# Patient Record
Sex: FEMALE | Race: BLACK OR AFRICAN AMERICAN | NOT HISPANIC OR LATINO | Employment: PART TIME | RURAL
[De-identification: names, ages, dates, MRNs, and addresses within clinical notes are randomized per-mention and may not be internally consistent; named-entity substitution may affect disease eponyms.]

---

## 2020-05-12 LAB — HIV1/HIV2 ANTIBODY: NONREACTIVE

## 2020-11-13 ENCOUNTER — HISTORICAL (OUTPATIENT)
Dept: ADMINISTRATIVE | Facility: HOSPITAL | Age: 35
End: 2020-11-13

## 2020-11-13 LAB — HCG UR QL IA.RAPID: NEGATIVE

## 2020-11-17 LAB
REPORT: 25
REPORT: 25
REPORT: NORMAL

## 2020-12-16 ENCOUNTER — HISTORICAL (OUTPATIENT)
Dept: ADMINISTRATIVE | Facility: HOSPITAL | Age: 35
End: 2020-12-16

## 2020-12-17 LAB
INSULIN SERPL-ACNC: NORMAL U[IU]/ML
LAB AP CLINICAL INFORMATION: NORMAL
LAB AP COMMENTS: NORMAL
LAB AP DIAGNOSIS - HISTORICAL: NORMAL
LAB AP GROSS PATHOLOGY - HISTORICAL: NORMAL
LAB AP SPECIMEN SUBMITTED - HISTORICAL: NORMAL

## 2021-03-17 ENCOUNTER — OFFICE VISIT (OUTPATIENT)
Dept: FAMILY MEDICINE | Facility: CLINIC | Age: 36
End: 2021-03-17
Payer: MEDICARE

## 2021-03-17 VITALS
OXYGEN SATURATION: 100 % | WEIGHT: 225 LBS | DIASTOLIC BLOOD PRESSURE: 80 MMHG | HEIGHT: 65 IN | BODY MASS INDEX: 37.49 KG/M2 | HEART RATE: 94 BPM | TEMPERATURE: 98 F | RESPIRATION RATE: 20 BRPM | SYSTOLIC BLOOD PRESSURE: 132 MMHG

## 2021-03-17 DIAGNOSIS — R30.0 DYSURIA: Primary | ICD-10-CM

## 2021-03-17 DIAGNOSIS — N30.00 ACUTE CYSTITIS WITHOUT HEMATURIA: ICD-10-CM

## 2021-03-17 DIAGNOSIS — N89.8 VAGINAL DISCHARGE: ICD-10-CM

## 2021-03-17 PROBLEM — Z95.810 CARDIAC DEFIBRILLATOR IN SITU: Status: ACTIVE | Noted: 2018-03-13

## 2021-03-17 PROBLEM — I48.0 PAROXYSMAL ATRIAL FIBRILLATION: Status: ACTIVE | Noted: 2018-06-05

## 2021-03-17 LAB
BACTERIA #/AREA URNS HPF: ABNORMAL /HPF
BILIRUB SERPL-MCNC: NEGATIVE MG/DL
BLOOD URINE, POC: ABNORMAL
CLUE CELLS VAG QL WET PREP: ABNORMAL /LPF
COLOR, POC UA: ABNORMAL
GLUCOSE UR QL STRIP: NEGATIVE
KETONES UR QL STRIP: NEGATIVE
LEUKOCYTE ESTERASE URINE, POC: ABNORMAL
NITRITE, POC UA: NEGATIVE
PH, POC UA: 5.5
PROTEIN, POC: NEGATIVE
RBC #/AREA URNS HPF: ABNORMAL /HPF
SPECIFIC GRAVITY, POC UA: 1.01
SQUAMOUS #/AREA URNS LPF: ABNORMAL /[LPF]
TRICHOMONAS #/AREA URNS HPF: ABNORMAL /HPF
UROBILINOGEN, POC UA: 0.2
WBC #/AREA URNS HPF: ABNORMAL /HPF
WBC CLUMPS, UA: ABNORMAL /HPF
YEAST #/AREA URNS HPF: ABNORMAL /HPF

## 2021-03-17 PROCEDURE — 99214 PR OFFICE/OUTPT VISIT, EST, LEVL IV, 30-39 MIN: ICD-10-PCS | Mod: ,,, | Performed by: FAMILY MEDICINE

## 2021-03-17 PROCEDURE — 99214 OFFICE O/P EST MOD 30 MIN: CPT | Mod: ,,, | Performed by: FAMILY MEDICINE

## 2021-03-17 PROCEDURE — 87210 SMEAR WET MOUNT SALINE/INK: CPT

## 2021-03-17 PROCEDURE — 81003 URINALYSIS AUTO W/O SCOPE: CPT | Mod: RHCUB | Performed by: FAMILY MEDICINE

## 2021-03-17 RX ORDER — FLUTICASONE PROPIONATE AND SALMETEROL 250; 50 UG/1; UG/1
1 POWDER RESPIRATORY (INHALATION) 2 TIMES DAILY
COMMUNITY
End: 2023-04-18

## 2021-03-17 RX ORDER — ALBUTEROL SULFATE 1.25 MG/3ML
1.25 SOLUTION RESPIRATORY (INHALATION) EVERY 6 HOURS PRN
COMMUNITY

## 2021-03-17 RX ORDER — NITROFURANTOIN 25; 75 MG/1; MG/1
100 CAPSULE ORAL 2 TIMES DAILY
Qty: 14 CAPSULE | Refills: 0 | Status: SHIPPED | OUTPATIENT
Start: 2021-03-17 | End: 2021-03-24

## 2021-03-17 RX ORDER — POTASSIUM CHLORIDE 750 MG/1
20 TABLET, EXTENDED RELEASE ORAL ONCE
COMMUNITY

## 2021-03-17 RX ORDER — LANOLIN ALCOHOL/MO/W.PET/CERES
400 CREAM (GRAM) TOPICAL DAILY
COMMUNITY
End: 2021-05-14 | Stop reason: SDUPTHER

## 2021-03-17 RX ORDER — FUROSEMIDE 20 MG/1
20 TABLET ORAL
COMMUNITY

## 2021-03-17 RX ORDER — CARVEDILOL 25 MG/1
12.5 TABLET ORAL 2 TIMES DAILY WITH MEALS
COMMUNITY

## 2021-03-17 RX ORDER — MONTELUKAST SODIUM 10 MG/1
10 TABLET ORAL NIGHTLY
COMMUNITY

## 2021-03-17 RX ORDER — SPIRONOLACTONE 25 MG/1
25 TABLET ORAL DAILY
COMMUNITY
End: 2022-01-19 | Stop reason: SDUPTHER

## 2021-03-17 RX ORDER — DIGOXIN 250 MCG
250 TABLET ORAL DAILY
COMMUNITY
End: 2022-02-16 | Stop reason: SDUPTHER

## 2021-03-17 RX ORDER — HYDRALAZINE HYDROCHLORIDE 25 MG/1
25 TABLET, FILM COATED ORAL 3 TIMES DAILY
COMMUNITY
End: 2023-04-18

## 2021-03-30 ENCOUNTER — TELEPHONE (OUTPATIENT)
Dept: FAMILY MEDICINE | Facility: CLINIC | Age: 36
End: 2021-03-30

## 2021-03-30 RX ORDER — FLUCONAZOLE 150 MG/1
150 TABLET ORAL DAILY
Qty: 1 TABLET | Refills: 0 | Status: SHIPPED | OUTPATIENT
Start: 2021-03-30 | End: 2021-03-31

## 2021-04-07 ENCOUNTER — OFFICE VISIT (OUTPATIENT)
Dept: FAMILY MEDICINE | Facility: CLINIC | Age: 36
End: 2021-04-07
Payer: MEDICARE

## 2021-04-07 VITALS
HEIGHT: 65 IN | HEART RATE: 89 BPM | RESPIRATION RATE: 20 BRPM | BODY MASS INDEX: 39.82 KG/M2 | WEIGHT: 239 LBS | SYSTOLIC BLOOD PRESSURE: 136 MMHG | DIASTOLIC BLOOD PRESSURE: 70 MMHG | TEMPERATURE: 97 F | OXYGEN SATURATION: 98 %

## 2021-04-07 DIAGNOSIS — N89.8 VAGINAL DISCHARGE: Primary | ICD-10-CM

## 2021-04-07 PROCEDURE — 99051 PR MEDICAL SERVICES, EVE/WKEND/HOLIDAY: ICD-10-PCS | Mod: ,,, | Performed by: FAMILY MEDICINE

## 2021-04-07 PROCEDURE — 99051 MED SERV EVE/WKEND/HOLIDAY: CPT | Mod: ,,, | Performed by: FAMILY MEDICINE

## 2021-04-07 PROCEDURE — 99213 PR OFFICE/OUTPT VISIT, EST, LEVL III, 20-29 MIN: ICD-10-PCS | Mod: ,,, | Performed by: FAMILY MEDICINE

## 2021-04-07 PROCEDURE — 99213 OFFICE O/P EST LOW 20 MIN: CPT | Mod: ,,, | Performed by: FAMILY MEDICINE

## 2021-04-09 RX ORDER — METRONIDAZOLE 500 MG/1
500 TABLET ORAL EVERY 12 HOURS
Qty: 14 TABLET | Refills: 0 | Status: SHIPPED | OUTPATIENT
Start: 2021-04-09 | End: 2021-04-28

## 2021-04-28 ENCOUNTER — OFFICE VISIT (OUTPATIENT)
Dept: FAMILY MEDICINE | Facility: CLINIC | Age: 36
End: 2021-04-28
Payer: MEDICARE

## 2021-04-28 VITALS
RESPIRATION RATE: 20 BRPM | HEIGHT: 65 IN | SYSTOLIC BLOOD PRESSURE: 130 MMHG | TEMPERATURE: 98 F | DIASTOLIC BLOOD PRESSURE: 82 MMHG | WEIGHT: 236 LBS | BODY MASS INDEX: 39.32 KG/M2 | OXYGEN SATURATION: 99 % | HEART RATE: 78 BPM

## 2021-04-28 DIAGNOSIS — J30.1 SEASONAL ALLERGIC RHINITIS DUE TO POLLEN: Primary | ICD-10-CM

## 2021-04-28 PROCEDURE — 99213 PR OFFICE/OUTPT VISIT, EST, LEVL III, 20-29 MIN: ICD-10-PCS | Mod: ,,, | Performed by: NURSE PRACTITIONER

## 2021-04-28 PROCEDURE — 99213 OFFICE O/P EST LOW 20 MIN: CPT | Mod: ,,, | Performed by: NURSE PRACTITIONER

## 2021-04-28 RX ORDER — BUSPIRONE HYDROCHLORIDE 15 MG/1
15 TABLET ORAL 2 TIMES DAILY
COMMUNITY
Start: 2021-04-09

## 2021-04-28 RX ORDER — FLUTICASONE PROPIONATE 50 MCG
2 SPRAY, SUSPENSION (ML) NASAL DAILY
Qty: 18.2 ML | Refills: 2 | Status: SHIPPED | OUTPATIENT
Start: 2021-04-28

## 2021-04-28 RX ORDER — LORATADINE 10 MG/1
10 TABLET ORAL DAILY
Qty: 90 TABLET | Refills: 1 | Status: SHIPPED | OUTPATIENT
Start: 2021-04-28 | End: 2022-04-28

## 2021-05-14 RX ORDER — LANOLIN ALCOHOL/MO/W.PET/CERES
400 CREAM (GRAM) TOPICAL DAILY
Qty: 90 TABLET | Refills: 0 | Status: SHIPPED | OUTPATIENT
Start: 2021-05-14

## 2021-06-22 ENCOUNTER — OFFICE VISIT (OUTPATIENT)
Dept: FAMILY MEDICINE | Facility: CLINIC | Age: 36
End: 2021-06-22
Payer: MEDICARE

## 2021-06-22 VITALS
WEIGHT: 227 LBS | DIASTOLIC BLOOD PRESSURE: 70 MMHG | SYSTOLIC BLOOD PRESSURE: 130 MMHG | BODY MASS INDEX: 37.82 KG/M2 | HEIGHT: 65 IN | HEART RATE: 65 BPM | OXYGEN SATURATION: 98 % | TEMPERATURE: 98 F

## 2021-06-22 DIAGNOSIS — I42.0 NONISCHEMIC DILATED CARDIOMYOPATHY: Primary | ICD-10-CM

## 2021-06-22 DIAGNOSIS — R20.2 PARESTHESIA: ICD-10-CM

## 2021-06-22 DIAGNOSIS — Z79.899 OTHER LONG TERM (CURRENT) DRUG THERAPY: ICD-10-CM

## 2021-06-22 LAB
ANION GAP SERPL CALCULATED.3IONS-SCNC: 10 MMOL/L (ref 7–16)
BUN SERPL-MCNC: 14 MG/DL (ref 7–18)
BUN/CREAT SERPL: 17 (ref 6–20)
CALCIUM SERPL-MCNC: 9 MG/DL (ref 8.5–10.1)
CHLORIDE SERPL-SCNC: 106 MMOL/L (ref 98–107)
CO2 SERPL-SCNC: 27 MMOL/L (ref 21–32)
CREAT SERPL-MCNC: 0.83 MG/DL (ref 0.55–1.02)
FOLATE SERPL-MCNC: 7.5 NG/ML (ref 3.1–17.5)
GLUCOSE SERPL-MCNC: 110 MG/DL (ref 74–106)
MAGNESIUM SERPL-MCNC: 2.2 MG/DL (ref 1.7–2.3)
POTASSIUM SERPL-SCNC: 3.7 MMOL/L (ref 3.5–5.1)
SODIUM SERPL-SCNC: 139 MMOL/L (ref 136–145)
VIT B12 SERPL-MCNC: 860 PG/ML (ref 193–986)

## 2021-06-22 PROCEDURE — 99214 PR OFFICE/OUTPT VISIT, EST, LEVL IV, 30-39 MIN: ICD-10-PCS | Mod: ,,, | Performed by: FAMILY MEDICINE

## 2021-06-22 PROCEDURE — 82607 VITAMIN B-12: CPT | Mod: ,,, | Performed by: CLINICAL MEDICAL LABORATORY

## 2021-06-22 PROCEDURE — 80048 BASIC METABOLIC PANEL: ICD-10-PCS | Mod: ,,, | Performed by: CLINICAL MEDICAL LABORATORY

## 2021-06-22 PROCEDURE — 82607 VITAMIN B12/FOLATE, SERUM PANEL: ICD-10-PCS | Mod: ,,, | Performed by: CLINICAL MEDICAL LABORATORY

## 2021-06-22 PROCEDURE — 82746 VITAMIN B12/FOLATE, SERUM PANEL: ICD-10-PCS | Mod: ,,, | Performed by: CLINICAL MEDICAL LABORATORY

## 2021-06-22 PROCEDURE — 80048 BASIC METABOLIC PNL TOTAL CA: CPT | Mod: ,,, | Performed by: CLINICAL MEDICAL LABORATORY

## 2021-06-22 PROCEDURE — 82746 ASSAY OF FOLIC ACID SERUM: CPT | Mod: ,,, | Performed by: CLINICAL MEDICAL LABORATORY

## 2021-06-22 PROCEDURE — 83735 MAGNESIUM: ICD-10-PCS | Mod: ,,, | Performed by: CLINICAL MEDICAL LABORATORY

## 2021-06-22 PROCEDURE — 83735 ASSAY OF MAGNESIUM: CPT | Mod: ,,, | Performed by: CLINICAL MEDICAL LABORATORY

## 2021-06-22 PROCEDURE — 99214 OFFICE O/P EST MOD 30 MIN: CPT | Mod: ,,, | Performed by: FAMILY MEDICINE

## 2021-06-22 RX ORDER — ISOSORBIDE DINITRATE 20 MG/1
20 TABLET ORAL 3 TIMES DAILY
COMMUNITY

## 2021-06-22 RX ORDER — DAPAGLIFLOZIN 10 MG/1
10 TABLET, FILM COATED ORAL DAILY
COMMUNITY

## 2021-06-24 ENCOUNTER — TELEPHONE (OUTPATIENT)
Dept: FAMILY MEDICINE | Facility: CLINIC | Age: 36
End: 2021-06-24

## 2021-06-24 RX ORDER — DOXYCYCLINE 100 MG/1
100 CAPSULE ORAL 2 TIMES DAILY
COMMUNITY
Start: 2021-06-18 | End: 2022-02-28

## 2021-06-25 ENCOUNTER — TELEPHONE (OUTPATIENT)
Dept: FAMILY MEDICINE | Facility: CLINIC | Age: 36
End: 2021-06-25

## 2021-06-25 RX ORDER — FLUCONAZOLE 150 MG/1
TABLET ORAL
Qty: 3 TABLET | Refills: 0 | Status: SHIPPED | OUTPATIENT
Start: 2021-06-25 | End: 2021-10-22

## 2021-07-22 ENCOUNTER — OFFICE VISIT (OUTPATIENT)
Dept: FAMILY MEDICINE | Facility: CLINIC | Age: 36
End: 2021-07-22
Payer: MEDICARE

## 2021-07-22 VITALS
WEIGHT: 221 LBS | SYSTOLIC BLOOD PRESSURE: 120 MMHG | OXYGEN SATURATION: 97 % | HEART RATE: 99 BPM | DIASTOLIC BLOOD PRESSURE: 92 MMHG | HEIGHT: 65 IN | TEMPERATURE: 99 F | BODY MASS INDEX: 36.82 KG/M2

## 2021-07-22 DIAGNOSIS — I10 HYPERTENSION, ESSENTIAL: ICD-10-CM

## 2021-07-22 DIAGNOSIS — I42.0 NONISCHEMIC DILATED CARDIOMYOPATHY: ICD-10-CM

## 2021-07-22 DIAGNOSIS — M79.604 RIGHT LEG PAIN: ICD-10-CM

## 2021-07-22 DIAGNOSIS — M79.18 MYALGIA, OTHER SITE: Primary | ICD-10-CM

## 2021-07-22 PROCEDURE — 99214 PR OFFICE/OUTPT VISIT, EST, LEVL IV, 30-39 MIN: ICD-10-PCS | Mod: ,,, | Performed by: FAMILY MEDICINE

## 2021-07-22 PROCEDURE — 99214 OFFICE O/P EST MOD 30 MIN: CPT | Mod: ,,, | Performed by: FAMILY MEDICINE

## 2021-07-22 RX ORDER — CITALOPRAM 40 MG/1
40 TABLET, FILM COATED ORAL DAILY
COMMUNITY
Start: 2021-03-03 | End: 2022-02-28

## 2021-07-22 RX ORDER — BACLOFEN 10 MG/1
TABLET ORAL
Qty: 30 TABLET | Refills: 0 | Status: SHIPPED | OUTPATIENT
Start: 2021-07-22 | End: 2021-10-22

## 2021-07-22 RX ORDER — CITALOPRAM 20 MG/1
20 TABLET, FILM COATED ORAL DAILY
COMMUNITY
End: 2021-07-22

## 2021-07-22 RX ORDER — HYDROXYZINE PAMOATE 50 MG/1
1-2 CAPSULE ORAL NIGHTLY
COMMUNITY
Start: 2021-04-09

## 2021-07-22 RX ORDER — DUPILUMAB 300 MG/2ML
INJECTION, SOLUTION SUBCUTANEOUS
COMMUNITY
Start: 2021-06-08

## 2021-08-16 RX ORDER — AZITHROMYCIN 250 MG/1
TABLET, FILM COATED ORAL
Qty: 2 TABLET | OUTPATIENT
Start: 2021-08-16

## 2021-08-17 ENCOUNTER — OFFICE VISIT (OUTPATIENT)
Dept: FAMILY MEDICINE | Facility: CLINIC | Age: 36
End: 2021-08-17
Payer: MEDICARE

## 2021-08-17 VITALS — HEIGHT: 65 IN | BODY MASS INDEX: 37.15 KG/M2 | WEIGHT: 223 LBS | TEMPERATURE: 99 F

## 2021-08-17 DIAGNOSIS — R07.0 PAIN IN THROAT: Primary | ICD-10-CM

## 2021-08-17 DIAGNOSIS — J00 ACUTE NASOPHARYNGITIS (COMMON COLD): ICD-10-CM

## 2021-08-17 LAB
CTP QC/QA: YES
S PYO RRNA THROAT QL PROBE: NEGATIVE

## 2021-08-17 PROCEDURE — 99213 OFFICE O/P EST LOW 20 MIN: CPT | Mod: ,,, | Performed by: FAMILY MEDICINE

## 2021-08-17 PROCEDURE — 87880 STREP A ASSAY W/OPTIC: CPT | Mod: RHCUB | Performed by: FAMILY MEDICINE

## 2021-08-17 PROCEDURE — 99213 PR OFFICE/OUTPT VISIT, EST, LEVL III, 20-29 MIN: ICD-10-PCS | Mod: ,,, | Performed by: FAMILY MEDICINE

## 2021-08-17 RX ORDER — PREDNISONE 20 MG/1
20 TABLET ORAL DAILY
Qty: 5 TABLET | Refills: 0 | Status: SHIPPED | OUTPATIENT
Start: 2021-08-17 | End: 2021-10-22

## 2021-09-16 ENCOUNTER — PATIENT OUTREACH (OUTPATIENT)
Dept: FAMILY MEDICINE | Facility: CLINIC | Age: 36
End: 2021-09-16

## 2021-10-22 ENCOUNTER — OFFICE VISIT (OUTPATIENT)
Dept: FAMILY MEDICINE | Facility: CLINIC | Age: 36
End: 2021-10-22
Payer: MEDICARE

## 2021-10-22 VITALS
OXYGEN SATURATION: 97 % | SYSTOLIC BLOOD PRESSURE: 120 MMHG | TEMPERATURE: 97 F | WEIGHT: 225 LBS | BODY MASS INDEX: 37.49 KG/M2 | DIASTOLIC BLOOD PRESSURE: 80 MMHG | HEART RATE: 108 BPM | HEIGHT: 65 IN

## 2021-10-22 DIAGNOSIS — Z82.49 FAMILY HISTORY OF CARDIOMYOPATHY: ICD-10-CM

## 2021-10-22 DIAGNOSIS — I10 HYPERTENSION, ESSENTIAL: ICD-10-CM

## 2021-10-22 DIAGNOSIS — I42.0 NONISCHEMIC DILATED CARDIOMYOPATHY: ICD-10-CM

## 2021-10-22 DIAGNOSIS — K21.9 GASTROESOPHAGEAL REFLUX DISEASE WITHOUT ESOPHAGITIS: Primary | ICD-10-CM

## 2021-10-22 PROCEDURE — 99214 OFFICE O/P EST MOD 30 MIN: CPT | Mod: ,,, | Performed by: FAMILY MEDICINE

## 2021-10-22 PROCEDURE — 99214 PR OFFICE/OUTPT VISIT, EST, LEVL IV, 30-39 MIN: ICD-10-PCS | Mod: ,,, | Performed by: FAMILY MEDICINE

## 2021-11-30 ENCOUNTER — OFFICE VISIT (OUTPATIENT)
Dept: FAMILY MEDICINE | Facility: CLINIC | Age: 36
End: 2021-11-30
Payer: MEDICARE

## 2021-11-30 ENCOUNTER — APPOINTMENT (OUTPATIENT)
Dept: RADIOLOGY | Facility: CLINIC | Age: 36
End: 2021-11-30
Attending: FAMILY MEDICINE
Payer: MEDICARE

## 2021-11-30 VITALS
HEART RATE: 102 BPM | HEIGHT: 65 IN | TEMPERATURE: 97 F | BODY MASS INDEX: 37.32 KG/M2 | WEIGHT: 224 LBS | OXYGEN SATURATION: 98 % | SYSTOLIC BLOOD PRESSURE: 130 MMHG | DIASTOLIC BLOOD PRESSURE: 72 MMHG

## 2021-11-30 DIAGNOSIS — R06.09 OTHER FORM OF DYSPNEA: ICD-10-CM

## 2021-11-30 DIAGNOSIS — I50.9 HEART FAILURE, UNSPECIFIED HF CHRONICITY, UNSPECIFIED HEART FAILURE TYPE: ICD-10-CM

## 2021-11-30 DIAGNOSIS — R07.89 OTHER CHEST PAIN: ICD-10-CM

## 2021-11-30 DIAGNOSIS — I50.9 HEART FAILURE, UNSPECIFIED HF CHRONICITY, UNSPECIFIED HEART FAILURE TYPE: Primary | ICD-10-CM

## 2021-11-30 LAB
ANION GAP SERPL CALCULATED.3IONS-SCNC: 13 MMOL/L (ref 7–16)
BUN SERPL-MCNC: 12 MG/DL (ref 7–18)
BUN/CREAT SERPL: 13 (ref 6–20)
CALCIUM SERPL-MCNC: 8.8 MG/DL (ref 8.5–10.1)
CHLORIDE SERPL-SCNC: 105 MMOL/L (ref 98–107)
CO2 SERPL-SCNC: 25 MMOL/L (ref 21–32)
CREAT SERPL-MCNC: 0.93 MG/DL (ref 0.55–1.02)
D DIMER PPP FEU-MCNC: 0.32 ΜG/ML (ref 0–0.47)
GLUCOSE SERPL-MCNC: 92 MG/DL (ref 74–106)
NT-PROBNP SERPL-MCNC: 305 PG/ML (ref 1–125)
POTASSIUM SERPL-SCNC: 4.1 MMOL/L (ref 3.5–5.1)
SODIUM SERPL-SCNC: 139 MMOL/L (ref 136–145)

## 2021-11-30 PROCEDURE — 71046 X-RAY EXAM CHEST 2 VIEWS: CPT | Mod: TC,RHCUB | Performed by: FAMILY MEDICINE

## 2021-11-30 PROCEDURE — 71046 XR CHEST PA AND LATERAL: ICD-10-PCS | Mod: 26,,, | Performed by: RADIOLOGY

## 2021-11-30 PROCEDURE — 99214 OFFICE O/P EST MOD 30 MIN: CPT | Mod: ,,, | Performed by: FAMILY MEDICINE

## 2021-11-30 PROCEDURE — 83880 NT-PRO NATRIURETIC PEPTIDE: ICD-10-PCS | Mod: ,,, | Performed by: CLINICAL MEDICAL LABORATORY

## 2021-11-30 PROCEDURE — 71046 X-RAY EXAM CHEST 2 VIEWS: CPT | Mod: 26,,, | Performed by: RADIOLOGY

## 2021-11-30 PROCEDURE — 99214 PR OFFICE/OUTPT VISIT, EST, LEVL IV, 30-39 MIN: ICD-10-PCS | Mod: ,,, | Performed by: FAMILY MEDICINE

## 2021-11-30 PROCEDURE — 80048 BASIC METABOLIC PNL TOTAL CA: CPT | Mod: ,,, | Performed by: CLINICAL MEDICAL LABORATORY

## 2021-11-30 PROCEDURE — 83880 ASSAY OF NATRIURETIC PEPTIDE: CPT | Mod: ,,, | Performed by: CLINICAL MEDICAL LABORATORY

## 2021-11-30 PROCEDURE — 85378 FIBRIN DEGRADE SEMIQUANT: CPT | Performed by: FAMILY MEDICINE

## 2021-11-30 PROCEDURE — 80048 BASIC METABOLIC PANEL: ICD-10-PCS | Mod: ,,, | Performed by: CLINICAL MEDICAL LABORATORY

## 2021-12-02 ENCOUNTER — TELEPHONE (OUTPATIENT)
Dept: FAMILY MEDICINE | Facility: CLINIC | Age: 36
End: 2021-12-02
Payer: MEDICARE

## 2022-01-19 ENCOUNTER — OFFICE VISIT (OUTPATIENT)
Dept: FAMILY MEDICINE | Facility: CLINIC | Age: 37
End: 2022-01-19
Payer: MEDICARE

## 2022-01-19 VITALS
BODY MASS INDEX: 35.32 KG/M2 | TEMPERATURE: 98 F | WEIGHT: 212 LBS | SYSTOLIC BLOOD PRESSURE: 130 MMHG | DIASTOLIC BLOOD PRESSURE: 78 MMHG | HEIGHT: 65 IN

## 2022-01-19 DIAGNOSIS — B37.2 YEAST INFECTION OF THE SKIN: Primary | ICD-10-CM

## 2022-01-19 DIAGNOSIS — I10 HYPERTENSION, ESSENTIAL: ICD-10-CM

## 2022-01-19 PROBLEM — L98.9 SKIN DISEASE: Status: ACTIVE | Noted: 2022-01-19

## 2022-01-19 PROBLEM — G56.01 RIGHT CARPAL TUNNEL SYNDROME: Status: ACTIVE | Noted: 2018-07-12

## 2022-01-19 PROBLEM — I49.9 ARRHYTHMIA: Status: ACTIVE | Noted: 2022-01-19

## 2022-01-19 PROCEDURE — 99213 PR OFFICE/OUTPT VISIT, EST, LEVL III, 20-29 MIN: ICD-10-PCS | Mod: ,,, | Performed by: NURSE PRACTITIONER

## 2022-01-19 PROCEDURE — 99213 OFFICE O/P EST LOW 20 MIN: CPT | Mod: ,,, | Performed by: NURSE PRACTITIONER

## 2022-01-19 RX ORDER — NYSTATIN 100000 [USP'U]/G
POWDER TOPICAL 2 TIMES DAILY
Qty: 60 G | Refills: 1 | Status: SHIPPED | OUTPATIENT
Start: 2022-01-19

## 2022-01-19 RX ORDER — FLUCONAZOLE 150 MG/1
TABLET ORAL
Qty: 2 TABLET | Refills: 0 | Status: SHIPPED | OUTPATIENT
Start: 2022-01-19 | End: 2022-02-28

## 2022-01-19 RX ORDER — SPIRONOLACTONE 25 MG/1
25 TABLET ORAL DAILY
Qty: 30 TABLET | Refills: 0 | Status: SHIPPED | OUTPATIENT
Start: 2022-01-19

## 2022-01-19 NOTE — PROGRESS NOTES
[facility]    Chief Complaint      Chief Complaint   Patient presents with    Rash     Rash with inflammation on left arm mainly armpit, states weeping, has had surgery on that arm (last year), itches; x1w keep getting worse and spreading; needs refill on magnesium and spirlactone     History of Present Illness      Joy Jeffrey is a 36 y.o. female with chronic conditions of hypertension and atrial fibrillation who presents today for c/o rash to left underarm x1 week.    Rash  This is a recurrent problem. The current episode started 1 to 4 weeks ago. The problem has been gradually worsening since onset. The affected locations include the left axilla. The rash is characterized by burning and itchiness. She was exposed to nothing. Pertinent negatives include no anorexia, congestion, cough, diarrhea, fatigue, fever, rhinorrhea, shortness of breath, sore throat or vomiting. Past treatments include nothing. Her past medical history is significant for eczema. There is no history of asthma or varicella.     Past Medical History:  Past Medical History:   Diagnosis Date    Asthma     Congestive heart failure 03/17/2021     Past Surgical History:   has a past surgical history that includes difubulator (Left, 03/17/2021).    Social History:  Social History     Tobacco Use    Smoking status: Never Smoker    Smokeless tobacco: Never Used   Substance Use Topics    Alcohol use: Not Currently    Drug use: Never     I personally reviewed all past medical, surgical, and social.     Review of Systems   Constitutional: Negative for fatigue, fever, malaise/fatigue and weight loss.   HENT: Negative for congestion, rhinorrhea and sore throat.    Eyes: Negative for discharge and redness.   Respiratory: Negative for cough and shortness of breath.    Cardiovascular: Negative for chest pain.   Gastrointestinal: Negative for anorexia, diarrhea and vomiting.   Genitourinary: Negative for dysuria and urgency.   Musculoskeletal:  Negative for myalgias.   Skin: Positive for itching and rash.   Neurological: Negative for dizziness and headaches.   Endo/Heme/Allergies: Negative for environmental allergies. Does not bruise/bleed easily.   Psychiatric/Behavioral: Negative for depression and suicidal ideas.      Medications:  Outpatient Encounter Medications as of 1/19/2022   Medication Sig Dispense Refill    albuterol (ACCUNEB) 1.25 mg/3 mL Nebu Take 1.25 mg by nebulization every 6 (six) hours as needed. Rescue      busPIRone (BUSPAR) 15 MG tablet Take 15 mg by mouth 2 (two) times daily.      carvediloL (COREG) 25 MG tablet Take 12.5 mg by mouth 2 (two) times daily with meals. Take 1 1/2 tab twice a day      citalopram (CELEXA) 40 MG tablet Take 40 mg by mouth once daily.      dapagliflozin (FARXIGA) 10 mg tablet Take 10 mg by mouth once daily.      digoxin (LANOXIN) 250 mcg tablet Take 250 mcg by mouth once daily.      DUPIXENT SYRINGE 300 mg/2 mL Syrg       fluticasone propionate (FLONASE) 50 mcg/actuation nasal spray 2 sprays (100 mcg total) by Each Nostril route once daily. 18.2 mL 2    fluticasone-salmeterol diskus inhaler 250-50 mcg Inhale 1 puff into the lungs 2 (two) times daily. Controller      furosemide (LASIX) 20 MG tablet Take 20 mg by mouth 3 (three) times daily after meals.      hydrALAZINE (APRESOLINE) 25 MG tablet Take 25 mg by mouth 3 (three) times daily.      hydrOXYzine pamoate (VISTARIL) 50 MG Cap Take 1-2 capsules by mouth every evening.      isosorbide dinitrate (ISORDIL) 20 MG tablet Take 20 mg by mouth 3 (three) times daily.      loratadine (CLARITIN) 10 mg tablet Take 1 tablet (10 mg total) by mouth once daily. 90 tablet 1    magnesium oxide (MAG-OX) 400 mg (241.3 mg magnesium) tablet Take 1 tablet (400 mg total) by mouth once daily. 90 tablet 0    montelukast (SINGULAIR) 10 mg tablet Take 10 mg by mouth every evening.      rivaroxaban (XARELTO) 20 mg Tab Take 1 tablet (20 mg total) by mouth daily with  "dinner or evening meal. 90 tablet 0    sacubitriL-valsartan (ENTRESTO)  mg per tablet Take 1 tablet by mouth 2 (two) times daily. 180 tablet 0    [DISCONTINUED] spironolactone (ALDACTONE) 25 MG tablet Take 25 mg by mouth once daily.      azithromycin (ZITHROMAX Z-KAZ ORAL) Take by mouth.      doxycycline (VIBRAMYCIN) 100 MG Cap Take 100 mg by mouth 2 (two) times daily.      fluconazole (DIFLUCAN) 150 MG Tab Take 1 tablet today, then repeat in 3 days. 2 tablet 0    nystatin (MYCOSTATIN) powder Apply topically 2 (two) times daily. 60 g 1    potassium chloride (KLOR-CON) 10 MEQ TbSR Take 20 mEq by mouth once.      spironolactone (ALDACTONE) 25 MG tablet Take 1 tablet (25 mg total) by mouth once daily. 30 tablet 0     No facility-administered encounter medications on file as of 1/19/2022.     Allergies:  Review of patient's allergies indicates:   Allergen Reactions    Levofloxacin     Lisinopril      Health Maintenance:  Immunization History   Administered Date(s) Administered    COVID-19, MRNA, LN-S, PF (MODERNA FULL 0.5 ML DOSE) 03/05/2021, 04/02/2021    Hepatitis A / Hepatitis B 07/28/2015    Hepatitis B, Adult 09/08/2015    Pneumococcal Polysaccharide - 23 Valent 07/28/2015      Health Maintenance   Topic Date Due    Lipid Panel  Never done    TETANUS VACCINE  Never done    Hepatitis C Screening  Discontinued      Physical Exam      Vital Signs  Temp: 97.5 °F (36.4 °C)  Temp src: Oral  BP: 130/78  BP Location: Right arm  Patient Position: Sitting  Height and Weight  Height: 5' 5" (165.1 cm)  Weight: 96.2 kg (212 lb)  BSA (Calculated - sq m): 2.1 sq meters  BMI (Calculated): 35.3  Weight in (lb) to have BMI = 25: 149.9]    Physical Exam  Vitals and nursing note reviewed.   Constitutional:       General: She is not in acute distress.     Appearance: Normal appearance.   HENT:      Head: Normocephalic and atraumatic.      Right Ear: External ear normal.      Left Ear: External ear normal.      " Nose: Nose normal.      Mouth/Throat:      Mouth: Mucous membranes are moist.      Pharynx: Oropharynx is clear.   Eyes:      Conjunctiva/sclera: Conjunctivae normal.      Pupils: Pupils are equal, round, and reactive to light.   Cardiovascular:      Rate and Rhythm: Normal rate and regular rhythm.      Pulses: Normal pulses.      Heart sounds: Normal heart sounds. No murmur heard.      Pulmonary:      Effort: Pulmonary effort is normal. No respiratory distress.      Breath sounds: Normal breath sounds.   Musculoskeletal:         General: Normal range of motion.      Cervical back: Normal range of motion and neck supple.   Skin:     General: Skin is warm and dry.      Findings: Erythema and rash present. Rash is scaling.   Neurological:      General: No focal deficit present.      Mental Status: She is alert and oriented to person, place, and time. Mental status is at baseline.   Psychiatric:         Mood and Affect: Mood normal.         Behavior: Behavior normal.         Thought Content: Thought content normal.         Judgment: Judgment normal.        Laboratory:    CMP:  Recent Labs   Lab 11/30/21  0827   Glucose 92   Calcium 8.8   Sodium 139   Potassium 4.1   CO2 25   Chloride 105   BUN 12     Assessment/Plan     Joy Jeffrey is a 36 y.o.female with:    1. Yeast infection of the skin  - nystatin (MYCOSTATIN) powder; Apply topically 2 (two) times daily.  Dispense: 60 g; Refill: 1  - fluconazole (DIFLUCAN) 150 MG Tab; Take 1 tablet today, then repeat in 3 days.  Dispense: 2 tablet; Refill: 0    2. Hypertension, essential  - spironolactone (ALDACTONE) 25 MG tablet; Take 1 tablet (25 mg total) by mouth once daily.  Dispense: 30 tablet; Refill: 0     Chronic conditions status updated as per HPI.  Other than changes above, cont current medications and maintain follow up with specialists.  Return to clinic as needed.    Marybeth Dickey, P  [facility]

## 2022-02-28 ENCOUNTER — OFFICE VISIT (OUTPATIENT)
Dept: FAMILY MEDICINE | Facility: CLINIC | Age: 37
End: 2022-02-28
Payer: MEDICARE

## 2022-02-28 VITALS
SYSTOLIC BLOOD PRESSURE: 110 MMHG | HEIGHT: 65 IN | BODY MASS INDEX: 35.16 KG/M2 | TEMPERATURE: 99 F | OXYGEN SATURATION: 98 % | DIASTOLIC BLOOD PRESSURE: 80 MMHG | WEIGHT: 211 LBS | HEART RATE: 108 BPM

## 2022-02-28 DIAGNOSIS — Z20.822 ENCOUNTER FOR LABORATORY TESTING FOR COVID-19 VIRUS: Primary | ICD-10-CM

## 2022-02-28 DIAGNOSIS — I42.0 NONISCHEMIC DILATED CARDIOMYOPATHY: ICD-10-CM

## 2022-02-28 LAB
ANION GAP SERPL CALCULATED.3IONS-SCNC: 9 MMOL/L (ref 7–16)
BASOPHILS # BLD AUTO: 0.09 K/UL (ref 0–0.2)
BASOPHILS NFR BLD AUTO: 0.9 % (ref 0–1)
BUN SERPL-MCNC: 13 MG/DL (ref 7–18)
BUN/CREAT SERPL: 13 (ref 6–20)
CALCIUM SERPL-MCNC: 8.7 MG/DL (ref 8.5–10.1)
CHLORIDE SERPL-SCNC: 107 MMOL/L (ref 98–107)
CO2 SERPL-SCNC: 26 MMOL/L (ref 21–32)
CREAT SERPL-MCNC: 1.04 MG/DL (ref 0.55–1.02)
CTP QC/QA: YES
DIFFERENTIAL METHOD BLD: ABNORMAL
EOSINOPHIL # BLD AUTO: 0.22 K/UL (ref 0–0.5)
EOSINOPHIL NFR BLD AUTO: 2.3 % (ref 1–4)
ERYTHROCYTE [DISTWIDTH] IN BLOOD BY AUTOMATED COUNT: 14.7 % (ref 11.5–14.5)
GLUCOSE SERPL-MCNC: 122 MG/DL (ref 74–106)
HCT VFR BLD AUTO: 37.6 % (ref 38–47)
HGB BLD-MCNC: 11.6 G/DL (ref 12–16)
IMM GRANULOCYTES # BLD AUTO: 0.03 K/UL (ref 0–0.04)
IMM GRANULOCYTES NFR BLD: 0.3 % (ref 0–0.4)
LYMPHOCYTES # BLD AUTO: 1.69 K/UL (ref 1–4.8)
LYMPHOCYTES NFR BLD AUTO: 17.3 % (ref 27–41)
MCH RBC QN AUTO: 26.2 PG (ref 27–31)
MCHC RBC AUTO-ENTMCNC: 30.9 G/DL (ref 32–36)
MCV RBC AUTO: 85.1 FL (ref 80–96)
MONOCYTES # BLD AUTO: 0.68 K/UL (ref 0–0.8)
MONOCYTES NFR BLD AUTO: 7 % (ref 2–6)
MPC BLD CALC-MCNC: 11.4 FL (ref 9.4–12.4)
NEUTROPHILS # BLD AUTO: 7.04 K/UL (ref 1.8–7.7)
NEUTROPHILS NFR BLD AUTO: 72.2 % (ref 53–65)
NRBC # BLD AUTO: 0 X10E3/UL
NRBC, AUTO (.00): 0 %
NT-PROBNP SERPL-MCNC: 357 PG/ML (ref 1–125)
PLATELET # BLD AUTO: 481 K/UL (ref 150–400)
POTASSIUM SERPL-SCNC: 3.7 MMOL/L (ref 3.5–5.1)
RBC # BLD AUTO: 4.42 M/UL (ref 4.2–5.4)
SARS-COV-2 AG RESP QL IA.RAPID: NEGATIVE
SODIUM SERPL-SCNC: 138 MMOL/L (ref 136–145)
WBC # BLD AUTO: 9.75 K/UL (ref 4.5–11)

## 2022-02-28 PROCEDURE — 80048 BASIC METABOLIC PANEL: ICD-10-PCS | Mod: ,,, | Performed by: CLINICAL MEDICAL LABORATORY

## 2022-02-28 PROCEDURE — 85025 CBC WITH DIFFERENTIAL: ICD-10-PCS | Mod: ,,, | Performed by: CLINICAL MEDICAL LABORATORY

## 2022-02-28 PROCEDURE — 85025 COMPLETE CBC W/AUTO DIFF WBC: CPT | Mod: ,,, | Performed by: CLINICAL MEDICAL LABORATORY

## 2022-02-28 PROCEDURE — 83880 NT-PRO NATRIURETIC PEPTIDE: ICD-10-PCS | Mod: ,,, | Performed by: CLINICAL MEDICAL LABORATORY

## 2022-02-28 PROCEDURE — 83880 ASSAY OF NATRIURETIC PEPTIDE: CPT | Mod: ,,, | Performed by: CLINICAL MEDICAL LABORATORY

## 2022-02-28 PROCEDURE — 99214 OFFICE O/P EST MOD 30 MIN: CPT | Mod: ,,, | Performed by: FAMILY MEDICINE

## 2022-02-28 PROCEDURE — 87426 SARSCOV CORONAVIRUS AG IA: CPT | Mod: RHCUB,CR | Performed by: FAMILY MEDICINE

## 2022-02-28 PROCEDURE — 99214 PR OFFICE/OUTPT VISIT, EST, LEVL IV, 30-39 MIN: ICD-10-PCS | Mod: ,,, | Performed by: FAMILY MEDICINE

## 2022-02-28 PROCEDURE — 80048 BASIC METABOLIC PNL TOTAL CA: CPT | Mod: ,,, | Performed by: CLINICAL MEDICAL LABORATORY

## 2022-02-28 NOTE — PROGRESS NOTES
[facility]    Chief Complaint      Chief Complaint   Patient presents with    Cough    SOB     Tachycardia, BP high, feels like low oxygen when walking    Fatigue       History of Present Illness      Joy Jeffrey is a 37 y.o. female with chronic conditions of non-ischemic dilated cardiomyopathy (EF=15-20%), defibrillator in situ, recurrent DVT, and HTN who presents today for cough and shortness of breath. States she first noted mild cough 2-3 weeks ago. States dyspnea and orthopnea has gotten progressively worse since that time. Denies chest pain or palpitations. Has been compliant with diuretics.     Past Medical History:  Past Medical History:   Diagnosis Date    Asthma     Congestive heart failure 03/17/2021       Past Surgical History:   has a past surgical history that includes difubulator (Left, 03/17/2021).    Social History:  Social History     Tobacco Use    Smoking status: Never Smoker    Smokeless tobacco: Never Used   Substance Use Topics    Alcohol use: Not Currently    Drug use: Never       I personally reviewed all past medical, surgical, and social.     Review of Systems   Constitutional: Positive for fatigue. Negative for fever.   HENT: Negative for ear pain.    Eyes: Negative for pain and visual disturbance.   Respiratory: Positive for cough and shortness of breath. Negative for chest tightness.    Cardiovascular: Negative for chest pain and leg swelling.   Gastrointestinal: Negative for abdominal pain.   Genitourinary: Negative for difficulty urinating.   Musculoskeletal: Negative for gait problem and myalgias.   Skin: Negative for rash.   Neurological: Negative for dizziness and light-headedness.   Hematological: Does not bruise/bleed easily.        Medications:  Outpatient Encounter Medications as of 2/28/2022   Medication Sig Dispense Refill    albuterol (ACCUNEB) 1.25 mg/3 mL Nebu Take 1.25 mg by nebulization every 6 (six) hours as needed. Rescue      busPIRone (BUSPAR)  15 MG tablet Take 15 mg by mouth 2 (two) times daily.      carvediloL (COREG) 25 MG tablet Take 12.5 mg by mouth 2 (two) times daily with meals. Take 1 1/2 tab twice a day      dapagliflozin (FARXIGA) 10 mg tablet Take 10 mg by mouth once daily.      DIGITEK 250 mcg (0.25 mg) tablet TAKE 1 TABLET BY MOUTH ONCE DAILY 90 tablet 0    DUPIXENT SYRINGE 300 mg/2 mL Syrg       fluticasone propionate (FLONASE) 50 mcg/actuation nasal spray 2 sprays (100 mcg total) by Each Nostril route once daily. 18.2 mL 2    fluticasone-salmeterol diskus inhaler 250-50 mcg Inhale 1 puff into the lungs 2 (two) times daily. Controller      furosemide (LASIX) 20 MG tablet Take 20 mg by mouth 3 (three) times daily after meals.      hydrALAZINE (APRESOLINE) 25 MG tablet Take 25 mg by mouth 3 (three) times daily.      hydrOXYzine pamoate (VISTARIL) 50 MG Cap Take 1-2 capsules by mouth every evening.      isosorbide dinitrate (ISORDIL) 20 MG tablet Take 20 mg by mouth 3 (three) times daily.      loratadine (CLARITIN) 10 mg tablet Take 1 tablet (10 mg total) by mouth once daily. 90 tablet 1    magnesium oxide (MAG-OX) 400 mg (241.3 mg magnesium) tablet Take 1 tablet (400 mg total) by mouth once daily. 90 tablet 0    montelukast (SINGULAIR) 10 mg tablet Take 10 mg by mouth every evening.      nystatin (MYCOSTATIN) powder Apply topically 2 (two) times daily. 60 g 1    potassium chloride (KLOR-CON) 10 MEQ TbSR Take 20 mEq by mouth once.      rivaroxaban (XARELTO) 20 mg Tab Take 1 tablet (20 mg total) by mouth daily with dinner or evening meal. 90 tablet 0    sacubitriL-valsartan (ENTRESTO)  mg per tablet Take 1 tablet by mouth 2 (two) times daily. 180 tablet 0    spironolactone (ALDACTONE) 25 MG tablet Take 1 tablet (25 mg total) by mouth once daily. 30 tablet 0    azithromycin (ZITHROMAX Z-KAZ ORAL) Take by mouth.      citalopram (CELEXA) 40 MG tablet Take 40 mg by mouth once daily.      doxycycline (VIBRAMYCIN) 100 MG Cap  "Take 100 mg by mouth 2 (two) times daily.      fluconazole (DIFLUCAN) 150 MG Tab Take 1 tablet today, then repeat in 3 days. (Patient not taking: Reported on 2/28/2022) 2 tablet 0     No facility-administered encounter medications on file as of 2/28/2022.       Allergies:  Review of patient's allergies indicates:   Allergen Reactions    Levofloxacin     Lisinopril        Health Maintenance:  Immunization History   Administered Date(s) Administered    COVID-19, MRNA, LN-S, PF (MODERNA FULL 0.5 ML DOSE) 03/05/2021, 04/02/2021    Hepatitis A / Hepatitis B 07/28/2015    Hepatitis B, Adult 09/08/2015    Pneumococcal Polysaccharide - 23 Valent 07/28/2015      Health Maintenance   Topic Date Due    Lipid Panel  Never done    TETANUS VACCINE  Never done    Hepatitis C Screening  Discontinued        Physical Exam      Vital Signs  Temp: 98.7 °F (37.1 °C)  Temp src: Oral  Pulse: 108  SpO2: 98 %  BP: 110/80  BP Location: Left arm  Patient Position: Sitting  Height and Weight  Height: 5' 5" (165.1 cm)  Weight: 95.7 kg (211 lb)  BSA (Calculated - sq m): 2.09 sq meters  BMI (Calculated): 35.1  Weight in (lb) to have BMI = 25: 149.9]    Physical Exam  Constitutional:       Appearance: Normal appearance.   HENT:      Head: Normocephalic and atraumatic.   Eyes:      Extraocular Movements: Extraocular movements intact.      Conjunctiva/sclera: Conjunctivae normal.      Pupils: Pupils are equal, round, and reactive to light.   Cardiovascular:      Rate and Rhythm: Regular rhythm. Tachycardia present.      Pulses: Normal pulses.           Radial pulses are 2+ on the right side and 2+ on the left side.      Heart sounds: Normal heart sounds.   Pulmonary:      Effort: Pulmonary effort is normal.      Breath sounds: Normal breath sounds.   Abdominal:      Palpations: Abdomen is soft.      Tenderness: There is no abdominal tenderness.   Musculoskeletal:         General: Normal range of motion.      Right lower leg: No edema.      " Left lower leg: No edema.   Skin:     General: Skin is warm and dry.      Findings: No rash.   Neurological:      General: No focal deficit present.      Mental Status: She is alert. Mental status is at baseline.   Psychiatric:         Mood and Affect: Mood normal.          Laboratory:  CBC:      CMP:  Recent Labs   Lab 11/30/21  0827   Glucose 92   Calcium 8.8   Sodium 139   Potassium 4.1   CO2 25   Chloride 105   BUN 12     LIPIDS:      TSH:      A1C:        Assessment/Plan     Joy Jeffrey is a 37 y.o.female with:     1. Encounter for laboratory testing for COVID-19 virus  - SARS Coronavirus 2 Antigen, POCT    2. Nonischemic dilated cardiomyopathy  - Vital signs stable  - Currently no telemetry beds available at [facility]  - Pt to check with cardiologist at [facility] this AM about possible admission; otherwise plans on going [facility] ED  - X-Ray Chest PA And Lateral; Future  - Basic Metabolic Panel; Future  - CBC Auto Differential; Future  - NT-Pro Natriuretic Peptide; Future       Total time spent face-to-face and non-face-to-face coordinating care for this encounter was: 30 min    Chronic conditions status updated as per HPI.  Other than changes above, cont current medications and maintain follow up with specialists.  Return to clinic in 1 month.    Eliezer Gambino DO  [facility]

## 2022-03-03 ENCOUNTER — TELEPHONE (OUTPATIENT)
Dept: FAMILY MEDICINE | Facility: CLINIC | Age: 37
End: 2022-03-03
Payer: MEDICARE

## 2022-03-03 NOTE — TELEPHONE ENCOUNTER
----- Message from Eliezer Gambino DO sent at 2/28/2022  3:27 PM CST -----  CXR demonstrated fluid on right lung vs possible pneumonia. Based on her other labs it's more likely acute heart failure than pneumonia.

## 2022-03-11 DIAGNOSIS — Z71.89 COMPLEX CARE COORDINATION: ICD-10-CM

## 2022-05-02 ENCOUNTER — OFFICE VISIT (OUTPATIENT)
Dept: FAMILY MEDICINE | Facility: CLINIC | Age: 37
End: 2022-05-02
Payer: MEDICARE

## 2022-05-02 VITALS
HEART RATE: 91 BPM | OXYGEN SATURATION: 98 % | TEMPERATURE: 99 F | BODY MASS INDEX: 34.66 KG/M2 | DIASTOLIC BLOOD PRESSURE: 80 MMHG | HEIGHT: 65 IN | SYSTOLIC BLOOD PRESSURE: 110 MMHG | WEIGHT: 208 LBS

## 2022-05-02 DIAGNOSIS — R19.8 UMBILICUS DISCHARGE: Primary | ICD-10-CM

## 2022-05-02 PROCEDURE — 87077 CULTURE, WOUND: ICD-10-PCS | Mod: ,,, | Performed by: CLINICAL MEDICAL LABORATORY

## 2022-05-02 PROCEDURE — 87070 CULTURE OTHR SPECIMN AEROBIC: CPT | Mod: ,,, | Performed by: CLINICAL MEDICAL LABORATORY

## 2022-05-02 PROCEDURE — 87186 SC STD MICRODIL/AGAR DIL: CPT | Mod: ,,, | Performed by: CLINICAL MEDICAL LABORATORY

## 2022-05-02 PROCEDURE — 99213 OFFICE O/P EST LOW 20 MIN: CPT | Mod: ,,, | Performed by: FAMILY MEDICINE

## 2022-05-02 PROCEDURE — 87186 CULTURE, WOUND: ICD-10-PCS | Mod: ,,, | Performed by: CLINICAL MEDICAL LABORATORY

## 2022-05-02 PROCEDURE — 87070 CULTURE, WOUND: ICD-10-PCS | Mod: ,,, | Performed by: CLINICAL MEDICAL LABORATORY

## 2022-05-02 PROCEDURE — 99213 PR OFFICE/OUTPT VISIT, EST, LEVL III, 20-29 MIN: ICD-10-PCS | Mod: ,,, | Performed by: FAMILY MEDICINE

## 2022-05-02 PROCEDURE — 87077 CULTURE AEROBIC IDENTIFY: CPT | Mod: ,,, | Performed by: CLINICAL MEDICAL LABORATORY

## 2022-05-02 NOTE — PROGRESS NOTES
"[facility]    Chief Complaint      Chief Complaint   Patient presents with    Drainage from Navel     Milky/foul odor       History of Present Illness      Joy Jeffrey is a 37 y.o. female with chronic conditions of  non-ischemic dilated cardiomyopathy (EF=15-20%), defibrillator in situ, recurrent DVT, hidradenitis suppurativa, and HTN who presents today for umbilicus drainage. Pt states symptoms have been present since January 2022. States she was seen by dermatology at [facility] and was prescribed nystatin powder. States she has been using nystatin daily, but still having foul smelling discharge from umbilicus. States umbilicus has not been painful but "feels raw."     Past Medical History:  Past Medical History:   Diagnosis Date    Asthma     Congestive heart failure 03/17/2021       Past Surgical History:   has a past surgical history that includes difubulator (Left, 03/17/2021).    Social History:  Social History     Tobacco Use    Smoking status: Never Smoker    Smokeless tobacco: Never Used   Substance Use Topics    Alcohol use: Not Currently    Drug use: Never       I personally reviewed all past medical, surgical, and social.     Review of Systems   Constitutional: Negative for fatigue and fever.   HENT: Negative for ear pain.    Eyes: Negative for pain and visual disturbance.   Respiratory: Negative for chest tightness and shortness of breath.    Cardiovascular: Negative for chest pain and leg swelling.   Gastrointestinal: Negative for abdominal pain.   Genitourinary: Negative for difficulty urinating.   Musculoskeletal: Negative for gait problem and myalgias.   Skin: Positive for rash.   Neurological: Negative for dizziness and light-headedness.   Hematological: Does not bruise/bleed easily.        Medications:  Outpatient Encounter Medications as of 5/2/2022   Medication Sig Dispense Refill    albuterol (ACCUNEB) 1.25 mg/3 mL Nebu Take 1.25 mg by nebulization every 6 (six) hours as needed. " Rescue      azithromycin (ZITHROMAX Z-KAZ ORAL) Take by mouth.      busPIRone (BUSPAR) 15 MG tablet Take 15 mg by mouth 2 (two) times daily.      carvediloL (COREG) 25 MG tablet Take 12.5 mg by mouth 2 (two) times daily with meals. Take 1 1/2 tab twice a day      dapagliflozin (FARXIGA) 10 mg tablet Take 10 mg by mouth once daily.      DIGITEK 250 mcg (0.25 mg) tablet TAKE 1 TABLET BY MOUTH ONCE DAILY 90 tablet 0    DUPIXENT SYRINGE 300 mg/2 mL Syrg       fluticasone propionate (FLONASE) 50 mcg/actuation nasal spray 2 sprays (100 mcg total) by Each Nostril route once daily. 18.2 mL 2    fluticasone-salmeterol diskus inhaler 250-50 mcg Inhale 1 puff into the lungs 2 (two) times daily. Controller      furosemide (LASIX) 20 MG tablet Take 20 mg by mouth 3 (three) times daily after meals.      hydrALAZINE (APRESOLINE) 25 MG tablet Take 25 mg by mouth 3 (three) times daily.      hydrOXYzine pamoate (VISTARIL) 50 MG Cap Take 1-2 capsules by mouth every evening.      isosorbide dinitrate (ISORDIL) 20 MG tablet Take 20 mg by mouth 3 (three) times daily.      loratadine (CLARITIN) 10 mg tablet Take 1 tablet (10 mg total) by mouth once daily. 90 tablet 1    magnesium oxide (MAG-OX) 400 mg (241.3 mg magnesium) tablet Take 1 tablet (400 mg total) by mouth once daily. 90 tablet 0    montelukast (SINGULAIR) 10 mg tablet Take 10 mg by mouth every evening.      nystatin (MYCOSTATIN) powder Apply topically 2 (two) times daily. 60 g 1    potassium chloride (KLOR-CON) 10 MEQ TbSR Take 20 mEq by mouth once.      rivaroxaban (XARELTO) 20 mg Tab Take 1 tablet (20 mg total) by mouth daily with dinner or evening meal. 90 tablet 0    sacubitriL-valsartan (ENTRESTO)  mg per tablet Take 1 tablet by mouth 2 (two) times daily. 180 tablet 0    spironolactone (ALDACTONE) 25 MG tablet Take 1 tablet (25 mg total) by mouth once daily. 30 tablet 0     No facility-administered encounter medications on file as of 5/2/2022.  "      Allergies:  Review of patient's allergies indicates:   Allergen Reactions    Levofloxacin     Lisinopril        Health Maintenance:  Immunization History   Administered Date(s) Administered    COVID-19, MRNA, LN-S, PF (MODERNA FULL 0.5 ML DOSE) 03/05/2021, 04/02/2021    Hepatitis A / Hepatitis B 07/28/2015    Hepatitis B, Adult 09/08/2015    Pneumococcal Polysaccharide - 23 Valent 07/28/2015      Health Maintenance   Topic Date Due    Lipid Panel  Never done    TETANUS VACCINE  Never done    Hepatitis C Screening  Discontinued        Physical Exam      Vital Signs  Temp: 98.5 °F (36.9 °C)  Temp src: Oral  Pulse: 91  SpO2: 98 %  BP: 110/80  BP Location: Left arm  Patient Position: Sitting  Height and Weight  Height: 5' 5" (165.1 cm)  Weight: 94.3 kg (208 lb)  BSA (Calculated - sq m): 2.08 sq meters  BMI (Calculated): 34.6  Weight in (lb) to have BMI = 25: 149.9]    Physical Exam  Constitutional:       Appearance: Normal appearance.   HENT:      Head: Normocephalic and atraumatic.   Eyes:      Extraocular Movements: Extraocular movements intact.      Conjunctiva/sclera: Conjunctivae normal.      Pupils: Pupils are equal, round, and reactive to light.   Cardiovascular:      Rate and Rhythm: Normal rate and regular rhythm.      Pulses: Normal pulses.           Radial pulses are 2+ on the right side and 2+ on the left side.      Heart sounds: Normal heart sounds.   Pulmonary:      Effort: Pulmonary effort is normal.      Breath sounds: Normal breath sounds.   Abdominal:      Palpations: Abdomen is soft.      Tenderness: There is no abdominal tenderness.   Musculoskeletal:         General: Normal range of motion.      Right lower leg: No edema.      Left lower leg: No edema.   Skin:     General: Skin is warm and dry.      Findings: No rash.      Comments: Small amount of purulent drainage noted in umbilicus   Neurological:      General: No focal deficit present.      Mental Status: She is alert. Mental " status is at baseline.   Psychiatric:         Mood and Affect: Mood normal.          Laboratory:  CBC:  Recent Labs   Lab 02/28/22  0813   WBC 9.75   RBC 4.42   Hemoglobin 11.6 L   Hematocrit 37.6 L   Platelet Count 481 H   MCV 85.1   MCH 26.2 L   MCHC 30.9 L     CMP:  Recent Labs   Lab 02/28/22  0813   Glucose 122 H   Calcium 8.7   Sodium 138   Potassium 3.7   CO2 26   Chloride 107   BUN 13     LIPIDS:      TSH:      A1C:        Assessment/Plan     Joy Jeffrey is a 37 y.o.female with:     1. Umbilicus discharge  - Will send for culture  - Due to persistent nature will refer to derm for evaluation  - Culture, Wound  - Ambulatory referral/consult to Dermatology; Future       Total time spent face-to-face and non-face-to-face coordinating care for this encounter was: 20 min    Chronic conditions status updated as per HPI.  Other than changes above, cont current medications and maintain follow up with specialists.  Return to clinic PRN.    Eliezer Gambino DO  [facility]

## 2022-05-05 LAB — MICROORGANISM SPEC CULT: ABNORMAL

## 2022-05-06 ENCOUNTER — TELEPHONE (OUTPATIENT)
Dept: FAMILY MEDICINE | Facility: CLINIC | Age: 37
End: 2022-05-06
Payer: MEDICARE

## 2022-05-06 RX ORDER — SULFAMETHOXAZOLE AND TRIMETHOPRIM 800; 160 MG/1; MG/1
1 TABLET ORAL 2 TIMES DAILY
Qty: 14 TABLET | Refills: 0 | Status: SHIPPED | OUTPATIENT
Start: 2022-05-06

## 2022-05-06 NOTE — TELEPHONE ENCOUNTER
----- Message from Eliezer Gambino DO sent at 5/6/2022  7:19 AM CDT -----  Culture grew out small amount of Staph. Sent in rx for a week's worth of Bactrim. Still needs to follow up with derm.

## 2022-06-13 ENCOUNTER — OFFICE VISIT (OUTPATIENT)
Dept: FAMILY MEDICINE | Facility: CLINIC | Age: 37
End: 2022-06-13
Payer: MEDICARE

## 2022-06-13 VITALS
SYSTOLIC BLOOD PRESSURE: 124 MMHG | OXYGEN SATURATION: 98 % | BODY MASS INDEX: 34.66 KG/M2 | HEIGHT: 65 IN | HEART RATE: 111 BPM | DIASTOLIC BLOOD PRESSURE: 68 MMHG | TEMPERATURE: 98 F | WEIGHT: 208 LBS

## 2022-06-13 DIAGNOSIS — J01.10 ACUTE NON-RECURRENT FRONTAL SINUSITIS: Primary | ICD-10-CM

## 2022-06-13 PROCEDURE — 96372 PR INJECTION,THERAP/PROPH/DIAG2ST, IM OR SUBCUT: ICD-10-PCS | Mod: ,,, | Performed by: NURSE PRACTITIONER

## 2022-06-13 PROCEDURE — 99213 OFFICE O/P EST LOW 20 MIN: CPT | Mod: ,,, | Performed by: NURSE PRACTITIONER

## 2022-06-13 PROCEDURE — 96372 THER/PROPH/DIAG INJ SC/IM: CPT | Mod: ,,, | Performed by: NURSE PRACTITIONER

## 2022-06-13 PROCEDURE — 99213 PR OFFICE/OUTPT VISIT, EST, LEVL III, 20-29 MIN: ICD-10-PCS | Mod: ,,, | Performed by: NURSE PRACTITIONER

## 2022-06-13 RX ORDER — AMOXICILLIN AND CLAVULANATE POTASSIUM 875; 125 MG/1; MG/1
1 TABLET, FILM COATED ORAL EVERY 12 HOURS
Qty: 20 TABLET | Refills: 0 | Status: SHIPPED | OUTPATIENT
Start: 2022-06-13 | End: 2022-06-23

## 2022-06-13 RX ORDER — DEXAMETHASONE SODIUM PHOSPHATE 4 MG/ML
8 INJECTION, SOLUTION INTRA-ARTICULAR; INTRALESIONAL; INTRAMUSCULAR; INTRAVENOUS; SOFT TISSUE
Status: COMPLETED | OUTPATIENT
Start: 2022-06-13 | End: 2022-06-13

## 2022-06-13 RX ADMIN — DEXAMETHASONE SODIUM PHOSPHATE 8 MG: 4 INJECTION, SOLUTION INTRA-ARTICULAR; INTRALESIONAL; INTRAMUSCULAR; INTRAVENOUS; SOFT TISSUE at 09:06

## 2022-06-13 NOTE — PROGRESS NOTES
[facility]          Chief Complaint        Chief Complaint   Patient presents with    Sinus Problem     Runny Nose (Yellow) & Dry Cough x 2 Days             History of Present Illness           Joy Jeffrey is a 37 y.o. female with chronic conditions of asthma and HTN who presents today for sinus congestion and dry cough for the past 2 days.  No sick contacts, no wheezing or fever noted.  Pt denies CP, dizziness, and SOB.          Past Medical History:     Past Medical History:   Diagnosis Date    Asthma     Congestive heart failure 03/17/2021             Past Surgical History:      has a past surgical history that includes difubulator (Left, 03/17/2021).          Social History:     Social History     Tobacco Use    Smoking status: Never Smoker    Smokeless tobacco: Never Used   Substance Use Topics    Alcohol use: Not Currently    Drug use: Never             I personally reviewed all past medical, surgical, and social.           Review of Systems   Constitutional: Negative.    HENT: Positive for congestion and sinus pressure.    Eyes: Negative.    Respiratory: Positive for cough.    Cardiovascular: Negative.    Gastrointestinal: Negative.    Endocrine: Negative.    Genitourinary: Negative.    Musculoskeletal: Negative.    Skin: Negative.    Allergic/Immunologic: Negative.    Neurological: Negative.    Psychiatric/Behavioral: Negative.               Medications:     Outpatient Encounter Medications as of 6/13/2022   Medication Sig Dispense Refill    albuterol (ACCUNEB) 1.25 mg/3 mL Nebu Take 1.25 mg by nebulization every 6 (six) hours as needed. Rescue      amoxicillin-clavulanate 875-125mg (AUGMENTIN) 875-125 mg per tablet Take 1 tablet by mouth every 12 (twelve) hours. for 10 days 20 tablet 0    azithromycin (ZITHROMAX Z-KAZ ORAL) Take by mouth.      busPIRone (BUSPAR) 15 MG tablet Take 15 mg by mouth 2 (two) times daily.      carvediloL (COREG) 25 MG tablet Take 12.5 mg by mouth 2 (two)  times daily with meals. Take 1 1/2 tab twice a day      dapagliflozin (FARXIGA) 10 mg tablet Take 10 mg by mouth once daily.      DIGITEK 250 mcg (0.25 mg) tablet TAKE 1 TABLET BY MOUTH ONCE DAILY 90 tablet 0    DUPIXENT SYRINGE 300 mg/2 mL Syrg       fluticasone propionate (FLONASE) 50 mcg/actuation nasal spray 2 sprays (100 mcg total) by Each Nostril route once daily. 18.2 mL 2    fluticasone-salmeterol diskus inhaler 250-50 mcg Inhale 1 puff into the lungs 2 (two) times daily. Controller      furosemide (LASIX) 20 MG tablet Take 20 mg by mouth 3 (three) times daily after meals.      hydrALAZINE (APRESOLINE) 25 MG tablet Take 25 mg by mouth 3 (three) times daily.      hydrOXYzine pamoate (VISTARIL) 50 MG Cap Take 1-2 capsules by mouth every evening.      isosorbide dinitrate (ISORDIL) 20 MG tablet Take 20 mg by mouth 3 (three) times daily.      loratadine (CLARITIN) 10 mg tablet Take 1 tablet (10 mg total) by mouth once daily. 90 tablet 1    magnesium oxide (MAG-OX) 400 mg (241.3 mg magnesium) tablet Take 1 tablet (400 mg total) by mouth once daily. 90 tablet 0    montelukast (SINGULAIR) 10 mg tablet Take 10 mg by mouth every evening.      nystatin (MYCOSTATIN) powder Apply topically 2 (two) times daily. 60 g 1    potassium chloride (KLOR-CON) 10 MEQ TbSR Take 20 mEq by mouth once.      rivaroxaban (XARELTO) 20 mg Tab Take 1 tablet (20 mg total) by mouth daily with dinner or evening meal. 90 tablet 0    sacubitriL-valsartan (ENTRESTO)  mg per tablet Take 1 tablet by mouth 2 (two) times daily. 180 tablet 0    spironolactone (ALDACTONE) 25 MG tablet Take 1 tablet (25 mg total) by mouth once daily. 30 tablet 0    sulfamethoxazole-trimethoprim 800-160mg (BACTRIM DS) 800-160 mg Tab Take 1 tablet by mouth 2 (two) times daily. 14 tablet 0     Facility-Administered Encounter Medications as of 6/13/2022   Medication Dose Route Frequency Provider Last Rate Last Admin    dexamethasone injection 8 mg   "8 mg Intramuscular 1 time in Clinic/HOD                  Allergies:     Review of patient's allergies indicates:   Allergen Reactions    Levofloxacin     Lisinopril              Health Maintenance:     Immunization History   Administered Date(s) Administered    COVID-19, MRNA, LN-S, PF (MODERNA FULL 0.5 ML DOSE) 03/05/2021, 04/02/2021    Hepatitis A / Hepatitis B 07/28/2015    Hepatitis B, Adult 09/08/2015    Pneumococcal Polysaccharide - 23 Valent 07/28/2015         Health Maintenance   Topic Date Due    Lipid Panel  Never done    TETANUS VACCINE  Never done    Hepatitis C Screening  Discontinued              Physical Exam           Vital Signs  Temp: 97.8 °F (36.6 °C)  Temp src: Oral  Pulse: (!) 111  SpO2: 98 %  BP: 124/68  BP Location: Right arm  Patient Position: Sitting  Height and Weight  Height: 5' 5" (165.1 cm)  Weight: 94.3 kg (208 lb)  BSA (Calculated - sq m): 2.08 sq meters  BMI (Calculated): 34.6  Weight in (lb) to have BMI = 25: 149.9]          Physical Exam  Vitals and nursing note reviewed.   Constitutional:       General: She is not in acute distress.     Appearance: Normal appearance. She is not ill-appearing.   HENT:      Head: Normocephalic.      Right Ear: External ear normal.      Left Ear: External ear normal.      Nose: Congestion and rhinorrhea present.      Mouth/Throat:      Mouth: Mucous membranes are moist.      Comments: PND  Eyes:      Pupils: Pupils are equal, round, and reactive to light.   Cardiovascular:      Rate and Rhythm: Normal rate and regular rhythm.      Pulses: Normal pulses.      Heart sounds: Normal heart sounds. No murmur heard.    No friction rub. No gallop.   Pulmonary:      Effort: Pulmonary effort is normal. No respiratory distress.      Breath sounds: Normal breath sounds. No stridor. No wheezing, rhonchi or rales.   Abdominal:      General: There is no distension.      Palpations: Abdomen is soft.   Musculoskeletal:         General: Normal range of motion. "      Cervical back: Normal range of motion and neck supple. No rigidity or tenderness.   Skin:     General: Skin is warm and dry.      Coloration: Skin is not jaundiced or pale.   Neurological:      General: No focal deficit present.      Mental Status: She is alert and oriented to person, place, and time. Mental status is at baseline.      Cranial Nerves: No cranial nerve deficit.      Sensory: No sensory deficit.      Motor: No weakness.      Coordination: Coordination normal.      Gait: Gait normal.   Psychiatric:         Mood and Affect: Mood normal.         Behavior: Behavior normal.         Thought Content: Thought content normal.         Judgment: Judgment normal.                Laboratory:     CBC:     Recent Labs   Lab 02/28/22  0813   WBC 9.75   RBC 4.42   Hemoglobin 11.6 L   Hematocrit 37.6 L   Platelet Count 481 H   MCV 85.1   MCH 26.2 L   MCHC 30.9 L        CMP:     Recent Labs   Lab 02/28/22  0813   Glucose 122 H   Calcium 8.7   Sodium 138   Potassium 3.7   CO2 26   Chloride 107   BUN 13        LIPIDS:            TSH:            A1C:                 Assessment/Plan          Joy Jeffrey is a 37 y.o.female with:           1. Acute non-recurrent frontal sinusitis  - dexamethasone injection 8 mg  - amoxicillin-clavulanate 875-125mg (AUGMENTIN) 875-125 mg per tablet; Take 1 tablet by mouth every 12 (twelve) hours. for 10 days  Dispense: 20 tablet; Refill: 0  -supportive measures advised  -if s/s worsen or last beyond 5-7 more days then start the augmentin           Total time spent face-to-face and non-face-to-face coordinating care for this encounter was: 20 min          Chronic conditions status updated as per HPI.  Other than changes above, cont current medications and maintain follow up with specialists.  Return to clinic PRN.          LUCIEN Esquivel     [facility]

## 2022-06-14 ENCOUNTER — OFFICE VISIT (OUTPATIENT)
Dept: DERMATOLOGY | Facility: CLINIC | Age: 37
End: 2022-06-14
Payer: MEDICARE

## 2022-06-14 VITALS — WEIGHT: 207.88 LBS | HEIGHT: 65 IN | RESPIRATION RATE: 18 BRPM | BODY MASS INDEX: 34.63 KG/M2

## 2022-06-14 DIAGNOSIS — L73.2 HIDRADENITIS SUPPURATIVA: Primary | ICD-10-CM

## 2022-06-14 DIAGNOSIS — R19.8 UMBILICUS DISCHARGE: ICD-10-CM

## 2022-06-14 PROCEDURE — 99203 PR OFFICE/OUTPT VISIT, NEW, LEVL III, 30-44 MIN: ICD-10-PCS | Mod: ,,, | Performed by: STUDENT IN AN ORGANIZED HEALTH CARE EDUCATION/TRAINING PROGRAM

## 2022-06-14 PROCEDURE — 99203 OFFICE O/P NEW LOW 30 MIN: CPT | Mod: ,,, | Performed by: STUDENT IN AN ORGANIZED HEALTH CARE EDUCATION/TRAINING PROGRAM

## 2022-06-14 RX ORDER — NITROGLYCERIN 0.4 MG/1
TABLET SUBLINGUAL
COMMUNITY
Start: 2022-04-22

## 2022-06-14 NOTE — PROGRESS NOTES
[facility]  Cy Arcos MD    [address], [place] [address]  [phone]    Fax: [phone]    Patient Name: Joy Jeffrey  Medical Record Number: [id number]  PCP: Eliezer Gambino DO  Age: 37 y.o. : 1985  Contact: [phone] (home)     CC: umbilical discharge  History of Present Illness:     Joy Jeffrey is a 37 y.o.  female with history of hidradenitis suppurative who presents to clinic today for umbilical discharge.  This has been present for 6 months. Patient states this occurred about 7 years ago, but discontinued until 6 months ago. Symptoms include creamy drainage and foul odor. Previous treatments include antibiotics, diflucan, and nystatin powder. Other concerns today are none.      The patient has no other concerns today.    Review of Systems:     Unremarkable other than mentioned above.     Physical Exam:     General: Relaxed, oriented, alert    Skin examination of the scalp, face, neck, chest, back, abdomen, upper extremities and lower extremities were normal except for as listed belo      Assessment and Plan:     1. Hidradenitis Suppurativa  - Fistula formation and draining sinuses, weeping acneiform pustules and papules, scarring, and acneiform nodules  Alfred Stage: 1    Plan:   - no systemic treatment necessary for HS   - given the nodularity appreciated deep in her umbilicus, will order ultrasound of umbilicus to determine if the lesion is simply an HS cyst, or an epidermal inclusion cyst, or perhaps a hernia or previously undiagnosed urachal cyst     Counseling.    Cleanse acneiform lesions and sinus tracts with anti-bacterial washes. Oral antibiotics can help reduce inflammation.  Discussed importance of not smoking and weight loss           Return to clinic PRN    AVS printed with patient instructions     Cy Arcos MD   Mohs Surgery/Dermatologic Oncology  Dermatology

## 2022-06-23 ENCOUNTER — HOSPITAL ENCOUNTER (OUTPATIENT)
Dept: RADIOLOGY | Facility: HOSPITAL | Age: 37
Discharge: HOME | End: 2022-06-23
Attending: STUDENT IN AN ORGANIZED HEALTH CARE EDUCATION/TRAINING PROGRAM
Payer: MEDICARE

## 2022-06-23 DIAGNOSIS — R19.8 UMBILICUS DISCHARGE: ICD-10-CM

## 2022-06-23 PROCEDURE — 76705 ECHO EXAM OF ABDOMEN: CPT | Mod: TC

## 2022-06-23 PROCEDURE — 76705 US ABDOMEN LIMITED: ICD-10-PCS | Mod: 26,,, | Performed by: RADIOLOGY

## 2022-06-23 PROCEDURE — 76705 ECHO EXAM OF ABDOMEN: CPT | Mod: 26,,, | Performed by: RADIOLOGY

## 2022-06-28 ENCOUNTER — TELEPHONE (OUTPATIENT)
Dept: DERMATOLOGY | Facility: CLINIC | Age: 37
End: 2022-06-28
Payer: MEDICARE

## 2022-06-28 NOTE — TELEPHONE ENCOUNTER
Patient states would like referral for cyst removal to be with general surgery in [place] at [facility].

## 2022-06-30 ENCOUNTER — EXTERNAL CHRONIC CARE MANAGEMENT (OUTPATIENT)
Dept: FAMILY MEDICINE | Facility: CLINIC | Age: 37
End: 2022-06-30
Payer: MEDICARE

## 2022-06-30 PROCEDURE — G0511 CCM/BHI BY RHC/FQHC 20MIN MO: HCPCS | Mod: ,,, | Performed by: FAMILY MEDICINE

## 2022-06-30 PROCEDURE — G0511 PR CHRONIC CARE MGMT, RHC OR FQHC ONLY, 20 MINS OR MORE: ICD-10-PCS | Mod: ,,, | Performed by: FAMILY MEDICINE

## 2022-07-31 ENCOUNTER — EXTERNAL CHRONIC CARE MANAGEMENT (OUTPATIENT)
Dept: FAMILY MEDICINE | Facility: CLINIC | Age: 37
End: 2022-07-31
Payer: MEDICARE

## 2022-07-31 PROCEDURE — G0511 CCM/BHI BY RHC/FQHC 20MIN MO: HCPCS | Mod: ,,, | Performed by: FAMILY MEDICINE

## 2022-07-31 PROCEDURE — G0511 PR CHRONIC CARE MGMT, RHC OR FQHC ONLY, 20 MINS OR MORE: ICD-10-PCS | Mod: ,,, | Performed by: FAMILY MEDICINE

## 2022-08-31 ENCOUNTER — EXTERNAL CHRONIC CARE MANAGEMENT (OUTPATIENT)
Dept: FAMILY MEDICINE | Facility: CLINIC | Age: 37
End: 2022-08-31
Payer: MEDICARE

## 2022-08-31 PROCEDURE — G0511 CCM/BHI BY RHC/FQHC 20MIN MO: HCPCS | Mod: ,,, | Performed by: NURSE PRACTITIONER

## 2022-08-31 PROCEDURE — G0511 PR CHRONIC CARE MGMT, RHC OR FQHC ONLY, 20 MINS OR MORE: ICD-10-PCS | Mod: ,,, | Performed by: NURSE PRACTITIONER

## 2022-09-30 ENCOUNTER — EXTERNAL CHRONIC CARE MANAGEMENT (OUTPATIENT)
Dept: FAMILY MEDICINE | Facility: CLINIC | Age: 37
End: 2022-09-30
Payer: MEDICARE

## 2022-09-30 PROCEDURE — G0511 CCM/BHI BY RHC/FQHC 20MIN MO: HCPCS | Mod: ,,, | Performed by: NURSE PRACTITIONER

## 2022-09-30 PROCEDURE — G0511 PR CHRONIC CARE MGMT, RHC OR FQHC ONLY, 20 MINS OR MORE: ICD-10-PCS | Mod: ,,, | Performed by: NURSE PRACTITIONER

## 2022-10-31 ENCOUNTER — EXTERNAL CHRONIC CARE MANAGEMENT (OUTPATIENT)
Dept: FAMILY MEDICINE | Facility: CLINIC | Age: 37
End: 2022-10-31
Payer: MEDICARE

## 2022-10-31 PROCEDURE — G0511 PR CHRONIC CARE MGMT, RHC OR FQHC ONLY, 20 MINS OR MORE: ICD-10-PCS | Mod: ,,, | Performed by: NURSE PRACTITIONER

## 2022-10-31 PROCEDURE — G0511 CCM/BHI BY RHC/FQHC 20MIN MO: HCPCS | Mod: ,,, | Performed by: NURSE PRACTITIONER

## 2022-11-30 ENCOUNTER — EXTERNAL CHRONIC CARE MANAGEMENT (OUTPATIENT)
Dept: FAMILY MEDICINE | Facility: CLINIC | Age: 37
End: 2022-11-30
Payer: MEDICARE

## 2022-11-30 PROCEDURE — G0511 PR CHRONIC CARE MGMT, RHC OR FQHC ONLY, 20 MINS OR MORE: ICD-10-PCS | Mod: ,,, | Performed by: NURSE PRACTITIONER

## 2022-11-30 PROCEDURE — G0511 CCM/BHI BY RHC/FQHC 20MIN MO: HCPCS | Mod: ,,, | Performed by: NURSE PRACTITIONER

## 2023-04-18 ENCOUNTER — OFFICE VISIT (OUTPATIENT)
Dept: FAMILY MEDICINE | Facility: CLINIC | Age: 38
End: 2023-04-18
Payer: MEDICARE

## 2023-04-18 VITALS
DIASTOLIC BLOOD PRESSURE: 60 MMHG | HEART RATE: 98 BPM | WEIGHT: 148 LBS | BODY MASS INDEX: 23.23 KG/M2 | SYSTOLIC BLOOD PRESSURE: 130 MMHG | OXYGEN SATURATION: 97 % | HEIGHT: 67 IN | RESPIRATION RATE: 18 BRPM | TEMPERATURE: 99 F

## 2023-04-18 DIAGNOSIS — R51.9 NONINTRACTABLE HEADACHE, UNSPECIFIED CHRONICITY PATTERN, UNSPECIFIED HEADACHE TYPE: Primary | ICD-10-CM

## 2023-04-18 PROCEDURE — 99213 PR OFFICE/OUTPT VISIT, EST, LEVL III, 20-29 MIN: ICD-10-PCS | Mod: ,,, | Performed by: STUDENT IN AN ORGANIZED HEALTH CARE EDUCATION/TRAINING PROGRAM

## 2023-04-18 PROCEDURE — 99213 OFFICE O/P EST LOW 20 MIN: CPT | Mod: ,,, | Performed by: STUDENT IN AN ORGANIZED HEALTH CARE EDUCATION/TRAINING PROGRAM

## 2023-04-18 NOTE — PROGRESS NOTES
"Subjective:       Patient ID: Joy Jeffrey is a 38 y.o. female.    Vitals:  height is 5' 7" (1.702 m) and weight is 67.1 kg (148 lb). Her oral temperature is 98.5 °F (36.9 °C). Her blood pressure is 130/60 and her pulse is 98. Her respiration is 18 and oxygen saturation is 97%.     Chief Complaint: Tension Headache (HA on left side of head only x few days) and Left Eye (Left eye has clear liquid running out from it (blood vessel swollen))    HPI  Pt in to clinic with c/o intermittent headaches x's 2 weeks. States has noticed that she's had ruptured blood vessels to left eye with drainage, has been evaluated by opthalmology and advised that everything was normal.  Is requesting to have a CT ordered due to her dad's history of "brain tumors".  Headaches have been alleviated with use of otc Tylenol, blood pressure has been wnl, is 130/60 today.     Neurological:  Positive for headaches. Negative for dizziness, light-headedness, speech difficulty, disorientation, altered mental status and seizures.   Psychiatric/Behavioral:  Negative for altered mental status and disorientation.      Objective:      Physical Exam   Constitutional: She is oriented to person, place, and time.   HENT:   Head:      Comments: No tenderness to scalp, frontal areas with palpation  Puffiness noted to right cheek  Eyes: Left eye exhibits discharge.      Comments: Clear drainage   Cardiovascular: Normal rate and regular rhythm.   Pulmonary/Chest: Effort normal and breath sounds normal.   Abdominal: Normal appearance.   Neurological: no focal deficit. She is alert, oriented to person, place, and time and at baseline. She displays no weakness. Coordination and gait normal.       Assessment:   No results found for this or any previous visit (from the past 168 hour(s)).       1. Nonintractable headache, unspecified chronicity pattern, unspecified headache type          Plan:         Nonintractable headache, unspecified chronicity pattern, unspecified " headache type  -     Cancel: CT Head Without Contrast; Future; Expected date: 04/18/2023  -     CT Head Without Contrast; Future; Expected date: 04/18/2023        - Continue with Acetaminophen otc as needed    -To ER for persisting or worsening symptoms

## 2023-05-12 ENCOUNTER — TELEPHONE (OUTPATIENT)
Dept: FAMILY MEDICINE | Facility: CLINIC | Age: 38
End: 2023-05-12
Payer: MEDICARE

## 2023-06-09 DIAGNOSIS — Z71.89 COMPLEX CARE COORDINATION: ICD-10-CM

## 2024-01-09 DIAGNOSIS — Z71.89 COMPLEX CARE COORDINATION: ICD-10-CM
